# Patient Record
Sex: MALE | Race: WHITE | NOT HISPANIC OR LATINO | Employment: FULL TIME | URBAN - METROPOLITAN AREA
[De-identification: names, ages, dates, MRNs, and addresses within clinical notes are randomized per-mention and may not be internally consistent; named-entity substitution may affect disease eponyms.]

---

## 2017-02-09 ENCOUNTER — HOSPITAL ENCOUNTER (OUTPATIENT)
Dept: RADIOLOGY | Facility: MEDICAL CENTER | Age: 27
End: 2017-02-09
Attending: PHYSICIAN ASSISTANT
Payer: COMMERCIAL

## 2017-02-09 ENCOUNTER — OFFICE VISIT (OUTPATIENT)
Dept: URGENT CARE | Facility: PHYSICIAN GROUP | Age: 27
End: 2017-02-09
Payer: COMMERCIAL

## 2017-02-09 VITALS
HEIGHT: 68 IN | HEART RATE: 109 BPM | SYSTOLIC BLOOD PRESSURE: 110 MMHG | OXYGEN SATURATION: 98 % | TEMPERATURE: 98.2 F | BODY MASS INDEX: 22.28 KG/M2 | RESPIRATION RATE: 12 BRPM | DIASTOLIC BLOOD PRESSURE: 70 MMHG | WEIGHT: 147 LBS

## 2017-02-09 DIAGNOSIS — S99.921A INJURY OF TOE ON RIGHT FOOT, INITIAL ENCOUNTER: ICD-10-CM

## 2017-02-09 DIAGNOSIS — S92.421B OPEN DISPLACED FRACTURE OF DISTAL PHALANX OF RIGHT GREAT TOE, INITIAL ENCOUNTER: ICD-10-CM

## 2017-02-09 DIAGNOSIS — S90.221A SUBUNGUAL HEMATOMA OF RIGHT FOOT, INITIAL ENCOUNTER: ICD-10-CM

## 2017-02-09 PROCEDURE — 99203 OFFICE O/P NEW LOW 30 MIN: CPT | Performed by: PHYSICIAN ASSISTANT

## 2017-02-09 PROCEDURE — 73660 X-RAY EXAM OF TOE(S): CPT | Mod: RT

## 2017-02-09 RX ORDER — CEFTRIAXONE 1 G/1
1 INJECTION, POWDER, FOR SOLUTION INTRAMUSCULAR; INTRAVENOUS ONCE
Status: COMPLETED | OUTPATIENT
Start: 2017-02-09 | End: 2017-02-09

## 2017-02-09 RX ORDER — HYDROCODONE BITARTRATE AND ACETAMINOPHEN 5; 325 MG/1; MG/1
TABLET ORAL
Qty: 10 TAB | Refills: 0 | Status: SHIPPED | OUTPATIENT
Start: 2017-02-09 | End: 2021-05-26

## 2017-02-09 RX ORDER — IBUPROFEN 800 MG/1
800 TABLET ORAL EVERY 8 HOURS PRN
Qty: 30 TAB | Refills: 0 | Status: SHIPPED | OUTPATIENT
Start: 2017-02-09 | End: 2021-05-26

## 2017-02-09 RX ORDER — AMOXICILLIN AND CLAVULANATE POTASSIUM 875; 125 MG/1; MG/1
1 TABLET, FILM COATED ORAL 2 TIMES DAILY
Qty: 20 TAB | Refills: 0 | Status: SHIPPED | OUTPATIENT
Start: 2017-02-09 | End: 2021-05-26

## 2017-02-09 RX ADMIN — CEFTRIAXONE 1 G: 1 INJECTION, POWDER, FOR SOLUTION INTRAMUSCULAR; INTRAVENOUS at 19:29

## 2017-02-09 ASSESSMENT — ENCOUNTER SYMPTOMS
VOMITING: 0
CONSTITUTIONAL NEGATIVE: 1
NAUSEA: 0
BRUISES/BLEEDS EASILY: 0
NEUROLOGICAL NEGATIVE: 1

## 2017-02-09 NOTE — MR AVS SNAPSHOT
"        Clayton Mcmullen   2017 5:15 PM   Office Visit   MRN: [id number]    Department:  [facility]   Dept Phone:  [phone]    Description:  Male : 1990   Provider:  Missy Nicole PA-C           Reason for Visit     Foot Problem x today / Rt foot       Allergies as of 2017     No Known Allergies      You were diagnosed with     Injury of toe on right foot, initial encounter   [405355]       Open displaced fracture of distal phalanx of right great toe, initial encounter   [[id number]]       Subungual hematoma of right foot, initial encounter   [[id number]]         Vital Signs     Blood Pressure Pulse Temperature Respirations Height Weight    110/70 mmHg 109 36.8 °C (98.2 °F) 12 1.727 m (5' 8\") 66.679 kg (147 lb)    Body Mass Index Oxygen Saturation                22.36 kg/m2 98%          Basic Information     Date Of Birth Sex Race Ethnicity Preferred Language    1990 Male White Non- English      Health Maintenance        Date Due Completion Dates    IMM HEP B VACCINE (1 of 3 - Primary Series) 1991 ---    IMM HEP A VACCINE (1 of 2 - Standard Series) 1992 ---    IMM HPV VACCINE (1 of 3 - Male 3 Dose Series) 2002 ---    IMM VARICELLA (CHICKENPOX) VACCINE (1 of 2 - 2 Dose Adolescent Series) 2004 ---    IMM DTaP/Tdap/Td Vaccine (1 - Tdap) 2010 ---    IMM INFLUENZA (1) 2016 ---            Current Immunizations     No immunizations on file.      Below and/or attached are the medications your provider expects you to take. Review all of your home medications and newly ordered medications with your provider and/or pharmacist. Follow medication instructions as directed by your provider and/or pharmacist. Please keep your medication list with you and share with your provider. Update the information when medications are discontinued, doses are changed, or new medications (including over-the-counter products) are added; and carry medication information at " all times in the event of emergency situations     Allergies:  No Known Allergies          Medications  Valid as of: February 09, 2017 -  7:36 PM    Generic Name Brand Name Tablet Size Instructions for use    Amoxicillin-Pot Clavulanate (Tab) AUGMENTIN 875-125 MG Take 1 Tab by mouth 2 times a day.        Hydrocodone-Acetaminophen (Tab) NORCO 5-325 MG 1-2 tablets at bedtime prn severe pain.  No driving.        Ibuprofen (Tab) MOTRIN 800 MG Take 1 Tab by mouth every 8 hours as needed (with food.).        .                 Medicines prescribed today were sent to:     [id number] - [place], [place] - [address]    [address] [place] [address]    Phone: [phone] Fax: [phone]    Open 24 Hours?: No      Medication refill instructions:       If your prescription bottle indicates you have medication refills left, it is not necessary to call your provider’s office. Please contact your pharmacy and they will refill your medication.    If your prescription bottle indicates you do not have any refills left, you may request refills at any time through one of the following ways: The online [employer] system (except Urgent Care), by calling your provider’s office, or by asking your pharmacy to contact your provider’s office with a refill request. Medication refills are processed only during regular business hours and may not be available until the next business day. Your provider may request additional information or to have a follow-up visit with you prior to refilling your medication.   *Please Note: Medication refills are assigned a new Rx number when refilled electronically. Your pharmacy may indicate that no refills were authorized even though a new prescription for the same medication is available at the pharmacy. Please request the medicine by name with the pharmacy before contacting your provider for a refill.        Your To Do List     Future Labs/Procedures Complete By Expires    DX-TOE(S) 2+ RIGHT   As directed 2/9/2018      Referral     A referral request has been sent to our patient care coordination department. Please allow 3-5 business days for us to process this request and contact you either by phone or mail. If you do not hear from us by the 5th business day, please call us at [phone].           [employer] Access Code: [email or web address]  Expires: 3/11/2017  7:36 PM    Your email address is not on file at [email or web address].  Email Addresses are required for you to sign up for [employer], please contact [phone] to verify your personal information and to provide your email address prior to attempting to register for [employer].    Clayton Mcmullen  [address] [place] [id number]    [employer]  A secure, online tool to manage your health information     [facility]’s [employer]® is a secure, online tool that connects you to your personalized health information from the privacy of your home -- day or night - making it very easy for you to manage your healthcare. Once the activation process is completed, you can even access your medical information using the [employer] jose, which is available for free in the Apple Jose store or Google Play store.     To learn more about [employer], visit [email or web address]    There are two levels of access available (as shown below):   My Chart Features  [facility] Primary Care Doctor [facility]  Specialists [facility]  Urgent  Care Non-[facility] Primary Care Doctor   Email your healthcare team securely and privately 24/7 X X X    Manage appointments: schedule your next appointment; view details of past/upcoming appointments X      Request prescription refills. X      View recent personal medical records, including lab and immunizations X X X X   View health record, including health history, allergies, medications X X X X   Read reports about your outpatient visits, procedures, consult and ER notes X X X X   See your discharge summary, which is a recap of your hospital and/or ER visit that  includes your diagnosis, lab results, and care plan X X  X     How to register for [employer]:  Once your e-mail address has been verified, follow the following steps to sign up for [employer].     1. Go to  [email or web address]  2. Click on the Sign Up Now box, which takes you to the New Member Sign Up page. You will need to provide the following information:  a. Enter your [employer] Access Code exactly as it appears at the top of this page. (You will not need to use this code after you’ve completed the sign-up process. If you do not sign up before the expiration date, you must request a new code.)   b. Enter your date of birth.   c. Enter your home email address.   d. Click Submit, and follow the next screen’s instructions.  3. Create a [employer] ID. This will be your [employer] login ID and cannot be changed, so think of one that is secure and easy to remember.  4. Create a [employer] password. You can change your password at any time.  5. Enter your Password Reset Question and Answer. This can be used at a later time if you forget your password.   6. Enter your e-mail address. This allows you to receive e-mail notifications when new information is available in [employer].  7. Click Sign Up. You can now view your health information.    For assistance activating your [employer] account, call [phone]

## 2017-02-09 NOTE — Clinical Note
February 9, 2017         Patient: Clayton Mcmullen   YOB: 1990   Date of Visit: 2/9/2017           To Whom it May Concern:    Clayton Mcmullen was seen in my clinic on 2/9/2017.  He is to be off work tomorrow and Saturday to recover.     If you have any questions or concerns, please don't hesitate to call.        Sincerely,           Missy Nicole PA-C  Electronically Signed

## 2017-02-10 NOTE — PROGRESS NOTES
"Subjective:      Clayton Mcmullen is a 26 y.o. male who presents with Foot Problem        Foot Problem  Pertinent negatives include no nausea, rash or vomiting.   Patient presents today for right big toe injury sustained moving a 160 pound tool box at home. He lifted it, it slipped crushing his big toe inside of his shoe under it.  Since then there has been pain and swelling. He notes a bit of numbness. It has been bleeding on the sides of the toenail and under the toenail, soaking through his sock a few times.  He feels he can move the toe ok overall.  He is UTD on his tetanus.  He has not attempted any interventions.      Review of Systems   Constitutional: Negative.    Gastrointestinal: Negative for nausea and vomiting.   Musculoskeletal:        RIGHT HALLUX SEE HPI   Skin: Negative for itching and rash.   Neurological: Negative.    Endo/Heme/Allergies: Does not bruise/bleed easily.       PMH:  has no past medical history on file.  MEDS:   Current outpatient prescriptions:   •  amoxicillin-clavulanate (AUGMENTIN) 875-125 MG Tab, Take 1 Tab by mouth 2 times a day., Disp: 20 Tab, Rfl: 0  •  hydrocodone-acetaminophen (NORCO) 5-325 MG Tab per tablet, 1-2 tablets at bedtime prn severe pain.  No driving., Disp: 10 Tab, Rfl: 0  •  ibuprofen (MOTRIN) 800 MG Tab, Take 1 Tab by mouth every 8 hours as needed (with food.)., Disp: 30 Tab, Rfl: 0  ALLERGIES: No Known Allergies  SURGHX: No past surgical history on file.  SOCHX:  Non smoker.   FH: Family history was reviewed, no pertinent findings to report     Objective:     /70 mmHg  Pulse 109  Temp(Src) 36.8 °C (98.2 °F)  Resp 12  Ht 1.727 m (5' 8\")  Wt 66.679 kg (147 lb)  BMI 22.36 kg/m2  SpO2 98%     Physical Exam   Constitutional: He is oriented to person, place, and time. He appears well-developed and well-nourished. No distress.   Eyes: EOM are normal. Pupils are equal, round, and reactive to light.   Cardiovascular: Normal rate.    Pulmonary/Chest: " Effort normal.   Musculoskeletal:        Right foot: There is tenderness. There is normal range of motion.        Feet:    Neurological: He is alert and oriented to person, place, and time.   Skin: Skin is warm and dry.   Psychiatric: He has a normal mood and affect. His behavior is normal.   Vitals reviewed.       RAD    FINDINGS:  There is an acute transverse minimally displaced fracture of the distal tuft of the distal phalanx of the right great toe. There is minimal overlying soft tissue swelling.  No articular communication is noted.  No dislocation.         Impression        Minimally displaced transverse fracture distal tuft distal phalanx right great toe         Reading Provider Reading Date     Pb Chavez M.D. Feb 9, 2017          Assessment/Plan:     1. Injury of toe on right foot, initial encounter  DX-TOE(S) 2+ RIGHT    cefTRIAXone (ROCEPHIN) injection 1 g    amoxicillin-clavulanate (AUGMENTIN) 875-125 MG Tab    ibuprofen (MOTRIN) 800 MG Tab   2. Open displaced fracture of distal phalanx of right great toe, initial encounter  hydrocodone-acetaminophen (NORCO) 5-325 MG Tab per tablet    ibuprofen (MOTRIN) 800 MG Tab    REFERRAL TO ORTHOPEDICS   3. Subungual hematoma of right foot, initial encounter  REFERRAL TO ORTHOPEDICS       -toenail intact however mildly lifted with active bleeding around it and large sub-ungual.   -will treat as open fracture.     -18 gauge used to make tiny hole to allow for active drainage of the hematoma.   -Rocephin given in clinic and patient monitored for 15 mins s/p shot, follow up Augmentin to cover.   -Motrin for mild to moderate pain up to TID with food.  Norco for severe pain, bedtime , no driving.   -wrapped, padded and dressed.  Crutches.   -rest, ice, elevation.   -ortho follow up, referral placed  -ER precautions discussed      Missy Nicole PA-C

## 2017-09-19 ENCOUNTER — HOSPITAL ENCOUNTER (OUTPATIENT)
Dept: RADIOLOGY | Facility: MEDICAL CENTER | Age: 27
End: 2017-09-19
Attending: EMERGENCY MEDICINE
Payer: COMMERCIAL

## 2017-09-19 ENCOUNTER — OFFICE VISIT (OUTPATIENT)
Dept: URGENT CARE | Facility: PHYSICIAN GROUP | Age: 27
End: 2017-09-19
Payer: COMMERCIAL

## 2017-09-19 VITALS
WEIGHT: 147 LBS | HEART RATE: 85 BPM | TEMPERATURE: 99.1 F | RESPIRATION RATE: 16 BRPM | DIASTOLIC BLOOD PRESSURE: 86 MMHG | OXYGEN SATURATION: 100 % | SYSTOLIC BLOOD PRESSURE: 110 MMHG | HEIGHT: 68 IN | BODY MASS INDEX: 22.28 KG/M2
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
## 2017-09-19 DIAGNOSIS — M54.2 NECK PAIN: ICD-10-CM

## 2017-09-19 PROCEDURE — 99203 OFFICE O/P NEW LOW 30 MIN: CPT | Performed by: EMERGENCY MEDICINE

## 2017-09-19 PROCEDURE — 72070 X-RAY EXAM THORAC SPINE 2VWS: CPT

## 2017-09-19 PROCEDURE — 72040 X-RAY EXAM NECK SPINE 2-3 VW: CPT

## 2017-09-19 RX ORDER — METHOCARBAMOL 750 MG/1
1500 TABLET, FILM COATED ORAL 3 TIMES DAILY
Qty: 45 TAB | Refills: 0 | Status: SHIPPED | OUTPATIENT
Start: 2017-09-19 | End: 2021-05-26

## 2017-09-19 ASSESSMENT — ENCOUNTER SYMPTOMS
FEVER: 0
HEMOPTYSIS: 0
TINGLING: 0
BACK PAIN: 1
CHILLS: 0
ABDOMINAL PAIN: 0
VOMITING: 0
NAUSEA: 0
NECK PAIN: 1
COUGH: 0
FALLS: 0
DIZZINESS: 0
NERVOUS/ANXIOUS: 0
MYALGIAS: 1
EYE DISCHARGE: 0

## 2017-09-19 NOTE — LETTER
September 19, 2017        Clayton Mcmullen  [address] [facility] [address] [facility] [address]        Dear Clayton:    Please ask for there next 2 days off from work for medical reasons    If you have any questions or concerns, please don't hesitate to call.        Sincerely,        JUVENAL Michaels M.D.    Electronically Signed
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
## 2019-01-21 ENCOUNTER — OFFICE VISIT (OUTPATIENT)
Dept: URGENT CARE | Facility: PHYSICIAN GROUP | Age: 29
End: 2019-01-21
Payer: COMMERCIAL

## 2019-01-21 VITALS
TEMPERATURE: 99.5 F | SYSTOLIC BLOOD PRESSURE: 120 MMHG | WEIGHT: 150 LBS | BODY MASS INDEX: 22.73 KG/M2 | OXYGEN SATURATION: 98 % | DIASTOLIC BLOOD PRESSURE: 78 MMHG | HEIGHT: 68 IN | RESPIRATION RATE: 12 BRPM | HEART RATE: 85 BPM

## 2019-01-21 DIAGNOSIS — J06.9 VIRAL URI WITH COUGH: ICD-10-CM

## 2019-01-21 PROCEDURE — 99214 OFFICE O/P EST MOD 30 MIN: CPT | Performed by: PHYSICIAN ASSISTANT

## 2019-01-21 RX ORDER — ALBUTEROL SULFATE 90 UG/1
2 AEROSOL, METERED RESPIRATORY (INHALATION) EVERY 6 HOURS PRN
Qty: 8.5 G | Refills: 0 | Status: SHIPPED | OUTPATIENT
Start: 2019-01-21 | End: 2021-05-26

## 2019-01-21 RX ORDER — CODEINE PHOSPHATE AND GUAIFENESIN 10; 100 MG/5ML; MG/5ML
5 SOLUTION ORAL EVERY 4 HOURS PRN
Qty: 100 ML | Refills: 0 | Status: SHIPPED | OUTPATIENT
Start: 2019-01-21 | End: 2019-01-26

## 2019-01-21 ASSESSMENT — ENCOUNTER SYMPTOMS
RHINORRHEA: 1
SPUTUM PRODUCTION: 1
WHEEZING: 1
HEADACHES: 0
MYALGIAS: 1
COUGH: 1
CARDIOVASCULAR NEGATIVE: 1
CHILLS: 1
SORE THROAT: 1
SINUS PAIN: 0
GASTROINTESTINAL NEGATIVE: 1
DIZZINESS: 0
SHORTNESS OF BREATH: 0
FEVER: 0

## 2019-01-21 NOTE — LETTER
January 21, 2019         Patient: Clayton Mcmullen   YOB: 1990   Date of Visit: 1/21/2019           To Whom it May Concern:    Clayton Mcmullen was seen in my clinic on 1/21/2019. He may return to work on Tues. Jan. 22nd.    If you have any questions or concerns, please don't hesitate to call.        Sincerely,           Wilian Maya P.A.-C.  Electronically Signed

## 2019-01-22 NOTE — PROGRESS NOTES
Subjective:      Clayton Mcmullen is a 28 y.o. male who presents with Cough (Wet cough, [id number] )            Cough   This is a new problem. The current episode started in the past 7 days. The problem has been gradually worsening. The problem occurs every few minutes. The cough is productive of sputum. Associated symptoms include chills, ear congestion, myalgias, postnasal drip, rhinorrhea, a sore throat and wheezing. Pertinent negatives include no ear pain, fever, headaches or shortness of breath. The symptoms are aggravated by lying down. He has tried OTC cough suppressant for the symptoms. The treatment provided mild relief. There is no history of asthma, bronchitis or pneumonia.       PMH:  has no past medical history on file.  MEDS:   Current Outpatient Prescriptions:   •  methocarbamol (ROBAXIN) 750 MG Tab, Take 2 Tabs by mouth 3 times a day. (Patient not taking: Reported on 1/21/2019), Disp: 45 Tab, Rfl: 0  •  amoxicillin-clavulanate (AUGMENTIN) 875-125 MG Tab, Take 1 Tab by mouth 2 times a day. (Patient not taking: Reported on 1/21/2019), Disp: 20 Tab, Rfl: 0  •  hydrocodone-acetaminophen (NORCO) 5-325 MG Tab per tablet, 1-2 tablets at bedtime prn severe pain.  No driving. (Patient not taking: Reported on 1/21/2019), Disp: 10 Tab, Rfl: 0  •  ibuprofen (MOTRIN) 800 MG Tab, Take 1 Tab by mouth every 8 hours as needed (with food.). (Patient not taking: Reported on 1/21/2019), Disp: 30 Tab, Rfl: 0  ALLERGIES: No Known Allergies  SURGHX: No past surgical history on file.  SOCHX:  reports that he has never smoked. He has never used smokeless tobacco.  FH: family history is not on file.    Review of Systems   Constitutional: Positive for chills. Negative for fever.   HENT: Positive for congestion, postnasal drip, rhinorrhea and sore throat. Negative for ear pain and sinus pain.    Respiratory: Positive for cough, sputum production and wheezing. Negative for shortness of breath.    Cardiovascular:  "Negative.    Gastrointestinal: Negative.    Musculoskeletal: Positive for myalgias. Negative for joint pain.   Neurological: Negative for dizziness and headaches.       Medications, Allergies, and current problem list reviewed today in Epic     Objective:     /78   Pulse 85   Temp 37.5 °C (99.5 °F) (Temporal)   Resp 12   Ht 1.727 m (5' 8\")   Wt 68 kg (150 lb)   SpO2 98%   BMI 22.81 kg/m²      Physical Exam   Constitutional: He is oriented to person, place, and time. He appears well-developed and well-nourished. No distress.   HENT:   Head: Normocephalic and atraumatic.   Right Ear: Tympanic membrane and external ear normal.   Left Ear: Tympanic membrane and external ear normal.   Nose: Nose normal.   Mouth/Throat: Oropharynx is clear and moist. No oropharyngeal exudate.   Eyes: Conjunctivae are normal. Right eye exhibits no discharge. Left eye exhibits no discharge.   Neck: Normal range of motion. Neck supple.   Cardiovascular: Normal rate, regular rhythm and normal heart sounds.    No murmur heard.  Pulmonary/Chest: Effort normal and breath sounds normal. No respiratory distress. He has no wheezes. He has no rales.   Lymphadenopathy:     He has no cervical adenopathy.   Neurological: He is alert and oriented to person, place, and time.   Skin: Skin is warm and dry. He is not diaphoretic.   Psychiatric: He has a normal mood and affect. His behavior is normal. Judgment and thought content normal.   Nursing note and vitals reviewed.              Assessment/Plan:     1. Viral URI with cough  guaifenesin-codeine (ROBITUSSIN AC) Solution oral solution    albuterol 108 (90 Base) MCG/ACT Aero Soln inhalation aerosol     PO2 adequate, lungs clear bilaterally, no signs of bacterial infection at this time.  3-day history of productive cough some congestion.  Cough worse at nighttime.    [facility] Aware web site evaluation: I have obtained and reviewed patient utilization report from [facility] pharmacy " database prior to writing prescription for controlled substance II, III or IV. Based on the report and my clinical assessment the prescription is medically necessary.   Patient is cautioned on sedation potential of narcotic medication; no drinking, driving or operating heavy machinery while on this medication.    Return to clinic or go to ED if symptoms worsen or persist. Indications for ED discussed at length. Patient voices understanding. Follow-up with your primary care provider in 3-5 days. Red flags discussed. All side effects of medication discussed including allergic response, GI upset, tendon injury, etc.    Please note that this dictation was created using voice recognition software. I have made every reasonable attempt to correct obvious errors, but I expect that there are errors of grammar and possibly content that I did not discover before finalizing the note.

## 2021-05-26 ENCOUNTER — OFFICE VISIT (OUTPATIENT)
Dept: URGENT CARE | Facility: PHYSICIAN GROUP | Age: 31
End: 2021-05-26
Payer: COMMERCIAL

## 2021-05-26 VITALS
BODY MASS INDEX: 21.22 KG/M2 | DIASTOLIC BLOOD PRESSURE: 98 MMHG | HEIGHT: 68 IN | TEMPERATURE: 98.6 F | WEIGHT: 140 LBS | SYSTOLIC BLOOD PRESSURE: 122 MMHG | OXYGEN SATURATION: 100 % | HEART RATE: 92 BPM

## 2021-05-26 DIAGNOSIS — K04.7 TOOTH INFECTION: ICD-10-CM

## 2021-05-26 PROCEDURE — 99213 OFFICE O/P EST LOW 20 MIN: CPT | Performed by: NURSE PRACTITIONER

## 2021-05-26 RX ORDER — AMOXICILLIN 500 MG/1
500 CAPSULE ORAL 2 TIMES DAILY
Qty: 14 CAPSULE | Refills: 0 | Status: SHIPPED | OUTPATIENT
Start: 2021-05-26 | End: 2021-06-02

## 2021-05-26 ASSESSMENT — ENCOUNTER SYMPTOMS
HEADACHES: 0
FEVER: 0
CHILLS: 0
NAUSEA: 0

## 2021-05-27 NOTE — PROGRESS NOTES
"Clayton Mcmullen is a 31 y.o. male who presents for Oral Swelling (1x day( right upper tooth) )      HPI this new problem.  Clayton is a 31-year-old male patient presents for oral swelling x1 day..  He is having pain and swelling in his right upper jaw.  He has a history of a tooth infection in the same area.  He has not been to a dentist for a while.  His cheek has been swelling more today.  He has felt fatigued.  He has had occasional chills today but has not measured a fever.  He has worked his entire shift today.  Cold packs right onto his upper jaw are the only thing that he is found that reduces the pain.    Review of Systems   Constitutional: Negative for chills, fever and malaise/fatigue.   HENT: Negative for congestion.    Cardiovascular: Negative for chest pain.   Gastrointestinal: Negative for nausea.   Neurological: Negative for headaches.       No Known Allergies  History reviewed. No pertinent past medical history.  History reviewed. No pertinent surgical history.  History reviewed. No pertinent family history.  Social History     Tobacco Use   • Smoking status: Never Smoker   • Smokeless tobacco: Never Used   • Tobacco comment:  e cigaretttes   Substance Use Topics   • Alcohol use: Yes     Comment: occ     There is no problem list on file for this patient.    No current outpatient medications on file prior to visit.     No current facility-administered medications on file prior to visit.          Objective:     /98 (BP Location: Right arm, Patient Position: Sitting, BP Cuff Size: Adult)   Pulse 92   Temp 37 °C (98.6 °F) (Temporal)   Ht 1.727 m (5' 8\")   Wt 63.5 kg (140 lb)   SpO2 100%   BMI 21.29 kg/m²     Physical Exam  Vitals and nursing note reviewed.   Constitutional:       General: He is not in acute distress.     Appearance: Normal appearance. He is well-developed. He is not ill-appearing or toxic-appearing.   HENT:      Head: Normocephalic.        Right Ear: Hearing, tympanic " membrane and ear canal normal.      Left Ear: Hearing, tympanic membrane and ear canal normal.      Nose:      Right Sinus: No maxillary sinus tenderness or frontal sinus tenderness.      Left Sinus: No maxillary sinus tenderness or frontal sinus tenderness.      Mouth/Throat:      Mouth: Mucous membranes are moist.      Dentition: Abnormal dentition. Dental caries present. No dental abscesses.      Pharynx: Uvula midline. No oropharyngeal exudate or uvula swelling.      Tonsils: No tonsillar abscesses.   Eyes:      General: Lids are normal.      Conjunctiva/sclera: Conjunctivae normal.   Neck:      Trachea: Trachea and phonation normal.   Cardiovascular:      Rate and Rhythm: Normal rate and regular rhythm.      Pulses: Normal pulses.      Heart sounds: Normal heart sounds. No murmur heard.     Pulmonary:      Effort: Pulmonary effort is normal. No respiratory distress.      Breath sounds: No decreased breath sounds or rhonchi.   Musculoskeletal:      Cervical back: Full passive range of motion without pain, normal range of motion and neck supple.   Lymphadenopathy:      Head:      Right side of head: Tonsillar adenopathy present. No preauricular or posterior auricular adenopathy.      Left side of head: Tonsillar adenopathy present. No preauricular or posterior auricular adenopathy.      Cervical: No cervical adenopathy.   Skin:     General: Skin is warm and dry.   Neurological:      Mental Status: He is alert and oriented to person, place, and time.   Psychiatric:         Mood and Affect: Mood normal.         Speech: Speech normal.         Behavior: Behavior normal. Behavior is cooperative.         Thought Content: Thought content normal.         Judgment: Judgment normal.         Assessment /Associated Orders:      1. Tooth infection  amoxicillin (AMOXIL) 500 MG Cap       Medical Decision Making:    Pt is clinically stable at today's acute urgent care visit.  No acute distress noted. Appropriate for outpatient  management at this time.   Acute problem today with uncertain prognosis.   FV with dentist encouraged.   Salt water gargles BID and prn. Suggested 1/4 to 1/2 teaspoon (1.5 to 3.0 g) of salt per one cup (8 ounces or 250 mL) of warm water.   OTC  analgesic of choice (acetaminophen or NSAID). Follow manufactures dosing and safety precautions.   Warm / cold compresses prn   Educated in proper administration of medication(s) ordered today including safety, possible SE, risks, benefits, rationale and alternatives to therapy.       Advised to follow-up with the primary care provider for recheck, reevaluation, and consideration of further management if necessary.   Discussed management options (risks,benefits, and alternatives to treatment). Expressed understanding and the treatment plan was agreed upon. Questions were encouraged and answered       Return to urgent care prn if new or worsening sx or if there is no improvement in condition prn . Red flags discussed and indications to immediately call 911 or present to the Emergency Department.     I personally reviewed prior external notes and test results pertinent to today's visit.  I have independently reviewed and interpreted all diagnostics ordered during this urgent care acute visit.   No recent antibiotic use  PDMP reviewed. No risks identified.     Time spent evaluating this patient was at least 20 minutes and includes preparing for visit, counseling/education, exam and evaluation, obtaining history, independent interpretation, ordering lab/test/procedures,medication management and documentation.

## 2025-03-30 ENCOUNTER — HOSPITAL ENCOUNTER (OUTPATIENT)
Dept: RADIOLOGY | Facility: MEDICAL CENTER | Age: 35
End: 2025-03-30
Payer: COMMERCIAL

## 2025-03-30 ENCOUNTER — APPOINTMENT (OUTPATIENT)
Dept: URGENT CARE | Facility: PHYSICIAN GROUP | Age: 35
End: 2025-03-30
Payer: COMMERCIAL

## 2025-03-30 ENCOUNTER — OFFICE VISIT (OUTPATIENT)
Dept: URGENT CARE | Facility: PHYSICIAN GROUP | Age: 35
End: 2025-03-30
Payer: COMMERCIAL

## 2025-03-30 VITALS
BODY MASS INDEX: 22.13 KG/M2 | HEIGHT: 68 IN | DIASTOLIC BLOOD PRESSURE: 70 MMHG | HEART RATE: 83 BPM | SYSTOLIC BLOOD PRESSURE: 132 MMHG | RESPIRATION RATE: 20 BRPM | WEIGHT: 146 LBS | OXYGEN SATURATION: 96 % | TEMPERATURE: 98.6 F

## 2025-03-30 DIAGNOSIS — M79.644 THUMB PAIN, RIGHT: ICD-10-CM

## 2025-03-30 DIAGNOSIS — S60.011A CONTUSION OF RIGHT THUMB WITHOUT DAMAGE TO NAIL, INITIAL ENCOUNTER: ICD-10-CM

## 2025-03-30 PROCEDURE — 73140 X-RAY EXAM OF FINGER(S): CPT | Mod: RT

## 2025-03-30 ASSESSMENT — ENCOUNTER SYMPTOMS
FEVER: 0
CHILLS: 0

## 2025-03-30 NOTE — LETTER
March 30, 2025    To Whom It May Concern:         This is confirmation that Clayton Mcmullen attended his scheduled appointment with SOCRATES Almonte on 3/30/25. Limit use of right hand throughout the week.          If you have any questions please do not hesitate to call me at the phone number listed below.    Sincerely,          ALFRED AlmonteRDAV.  [phone]

## 2025-03-30 NOTE — PROGRESS NOTES
"  CHIEF COMPLAINT  Chief Complaint   Patient presents with    Finger Injury     Right thumb injury x 3 days. Painful to move.     Subjective:   Clayton Mcmullen is a 34 y.o. male who presents to urgent care with concerns for injury to his right thumb.  Patient reports that approximately 3 days ago he struck his right thumb on the edge of a metal dumpster when attempting to throw boxes inside.  He reports symptoms of persistent pain and swelling since injury.  No loss of sensation or strength.  Mild tingling.  Denies any history of previous injury.  Patient states he has been taking Motrin and attempt alleviate pain.  He is right-hand dominant.      Review of Systems   Constitutional:  Negative for chills and fever.       PAST MEDICAL HISTORY  There are no active problems to display for this patient.      SURGICAL HISTORY  patient denies any surgical history    ALLERGIES  No Known Allergies    CURRENT MEDICATIONS  Home Medications       Reviewed by Phil Conrad'solitario (Medical Assistant) on 03/30/25 at 1357  Med List Status: <None>     Medication Last Dose Status        Patient Ananda Taking any Medications                           SOCIAL HISTORY  Social History     Tobacco Use    Smoking status: Never    Smokeless tobacco: Never    Tobacco comments:      e cigaretttes   Vaping Use    Vaping status: Some Days    Substances: Nicotine, Flavoring    Devices: Disposable, Pre-filled or refillable cartridge, Pre-filled pod   Substance and Sexual Activity    Alcohol use: Yes     Comment: occ    Drug use: Never    Sexual activity: Not on file       FAMILY HISTORY  History reviewed. No pertinent family history.      Medications, Allergies, and current problem list reviewed today in Epic.     Objective:     /70 (BP Location: Right arm, Patient Position: Sitting, BP Cuff Size: Adult long)   Pulse 83   Temp 37 °C (98.6 °F) (Temporal)   Resp 20   Ht 1.727 m (5' 8\")   Wt 66.2 kg (146 lb)   SpO2 96% "     Physical Exam  Vitals reviewed.   Constitutional:       General: He is not in acute distress.     Appearance: Normal appearance. He is not ill-appearing or toxic-appearing.   HENT:      Head: Normocephalic.   Cardiovascular:      Rate and Rhythm: Normal rate.      Pulses: Normal pulses.   Pulmonary:      Effort: Pulmonary effort is normal.   Musculoskeletal:      Right hand: Swelling and tenderness present. No deformity, lacerations or bony tenderness. Normal sensation. There is no disruption of two-point discrimination. Normal capillary refill. Normal pulse.      Comments: Neurovascular to hand intact   Skin:     General: Skin is warm.      Capillary Refill: Capillary refill takes less than 2 seconds.   Neurological:      General: No focal deficit present.      Mental Status: He is alert.   Psychiatric:         Mood and Affect: Mood normal.       RADIOLOGY RESULTS   DX-FINGER(S) 2+ RIGHT  Result Date: 3/30/2025  3/30/2025 2:15 PM HISTORY/REASON FOR EXAM:  Pain/Deformity Following Trauma. . TECHNIQUE/EXAM DESCRIPTION AND NUMBER OF VIEWS:  3 views of the RIGHT fingers. COMPARISON: None FINDINGS: Somewhat irregular appearance of one of the accessory ossicle/sesamoid bones of the thumb. There is no fracture or dislocation. The visualized osseous structures are in anatomic alignment. The joint spaces are grossly preserved. Bone mineralization is age-appropriate..     No acute osseous abnormality.            Assessment/Plan:     Diagnosis and associated orders:     1. Thumb pain, right  DX-FINGER(S) 2+ RIGHT      2. Contusion of right thumb without damage to nail, initial encounter           Comments/MDM:     X-ray reveals no acute osseous abnormality.  Discussed likely etiology of contusion.  Continue with ice, Tylenol and Motrin.  Avoid strenuous activity.  Return to clinic if symptoms worsen or fail to resolve.         Differential diagnosis, natural history, supportive care, and indications for immediate  follow-up discussed.    Advised the patient to follow-up with the primary care physician for recheck, reevaluation, and consideration of further management.    Please note that this dictation was created using voice recognition software. I have made a reasonable attempt to correct obvious errors, but I expect that there are errors of grammar and possibly content that I did not discover before finalizing the note.    This note was electronically signed by SOCRATES Almonte

## 2025-09-27 ENCOUNTER — APPOINTMENT (OUTPATIENT)
Dept: LAB | Facility: MEDICAL CENTER | Age: 35
End: 2025-09-27
Payer: COMMERCIAL